# Patient Record
Sex: MALE | Race: WHITE | URBAN - NONMETROPOLITAN AREA
[De-identification: names, ages, dates, MRNs, and addresses within clinical notes are randomized per-mention and may not be internally consistent; named-entity substitution may affect disease eponyms.]

---
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
## 2022-10-19 NOTE — PHYSICAL EXAM CONSTITUTIONAL:
Physical therapy evaluation completed. Recommended BSC. Patient has declined further PT services, stating he is doing fine and that he has all the help that he needs with his family  well developed, well nourished , in no acute distress , ambulating without difficulty , normal communication ability